# Patient Record
Sex: FEMALE | Race: WHITE | NOT HISPANIC OR LATINO
[De-identification: names, ages, dates, MRNs, and addresses within clinical notes are randomized per-mention and may not be internally consistent; named-entity substitution may affect disease eponyms.]

---

## 2017-01-14 ENCOUNTER — APPOINTMENT (OUTPATIENT)
Dept: ULTRASOUND IMAGING | Facility: CLINIC | Age: 27
End: 2017-01-14

## 2017-02-26 ENCOUNTER — RESULT REVIEW (OUTPATIENT)
Age: 27
End: 2017-02-26

## 2017-02-27 ENCOUNTER — APPOINTMENT (OUTPATIENT)
Dept: OBGYN | Facility: CLINIC | Age: 27
End: 2017-02-27

## 2017-04-24 ENCOUNTER — APPOINTMENT (OUTPATIENT)
Dept: OBGYN | Facility: CLINIC | Age: 27
End: 2017-04-24
Payer: COMMERCIAL

## 2017-04-24 PROCEDURE — 99213 OFFICE O/P EST LOW 20 MIN: CPT | Mod: 25

## 2017-04-24 PROCEDURE — 81025 URINE PREGNANCY TEST: CPT

## 2017-04-24 PROCEDURE — 76830 TRANSVAGINAL US NON-OB: CPT

## 2019-01-25 ENCOUNTER — APPOINTMENT (OUTPATIENT)
Dept: ORTHOPEDIC SURGERY | Facility: CLINIC | Age: 29
End: 2019-01-25
Payer: COMMERCIAL

## 2019-01-25 VITALS
SYSTOLIC BLOOD PRESSURE: 141 MMHG | WEIGHT: 140 LBS | DIASTOLIC BLOOD PRESSURE: 94 MMHG | HEIGHT: 64 IN | BODY MASS INDEX: 23.9 KG/M2 | HEART RATE: 60 BPM

## 2019-01-25 DIAGNOSIS — E72.11 HOMOCYSTINURIA: ICD-10-CM

## 2019-01-25 PROCEDURE — 72082 X-RAY EXAM ENTIRE SPI 2/3 VW: CPT

## 2019-01-25 PROCEDURE — 99203 OFFICE O/P NEW LOW 30 MIN: CPT

## 2019-01-25 NOTE — DISCUSSION/SUMMARY
[Medication Risks Reviewed] : Medication risks reviewed [de-identified] : Her scoliosis is stable. She should be seen again in 5 years. We discussed how to manage these episodes of back pain with tapering doses of over-the-counter nonsteroidal anti-inflammatory medicines. She will call me in 2 weeks if she has not made satisfactory progress.

## 2019-01-25 NOTE — HISTORY OF PRESENT ILLNESS
[de-identified] : I have treated this 28-year-old female since childhood. She has normal assistant Jennifer and significant scoliosis. She was last seen 4 years ago. We used a she had the onset of lower back pain and the following day he developed the flu. The back pain which was initially on the left side radiating towards the buttock was graded as a 7 and is currently only an ache. She was seen in urgent care where Flexeril was prescribed which she did not take. She took ibuprofen and blood only 400 mg once a day.

## 2019-01-25 NOTE — PHYSICAL EXAM
[de-identified] : On examination she continues with the stigmata of a significant scoliosis. There is no paravertebral muscle spasm. There is some left mid lumbar pain with side bending. There is some mild right-sided sciatic notch sensitivity but interestingly none on the left where she had some symptoms radiating into the left buttock. There is no trochanteric tenderness. Her lower extremity neurologic examination reveals 2+ symmetrical reflexes with normal motor power and sensation a straight leg raising is negative to 90°. [de-identified] : X-ray of the spine standing is stable. In 2015 she had a 38° right thoracic curve with a 41° left thoracolumbar curve and a 23° fractional lumbosacral curve. Today those curves are 36°, 41° and 23°.

## 2019-01-25 NOTE — REASON FOR VISIT
[Initial Visit] : an initial visit for [Back Pain] : back pain [Scoliosis] : scoliosis [FreeTextEntry2] : Resolving lower back pain and scoliosis

## 2019-04-08 ENCOUNTER — CHART COPY (OUTPATIENT)
Age: 29
End: 2019-04-08

## 2019-04-19 ENCOUNTER — APPOINTMENT (OUTPATIENT)
Dept: ORTHOPEDIC SURGERY | Facility: CLINIC | Age: 29
End: 2019-04-19
Payer: COMMERCIAL

## 2019-04-19 PROCEDURE — 99214 OFFICE O/P EST MOD 30 MIN: CPT

## 2019-04-19 NOTE — HISTORY OF PRESENT ILLNESS
[Worsening] : worsening [de-identified] : She has had a recent worsening of the back pain with radiation to the buttock lateral hip and anterior thigh that is worse with standing and immediately relieved with sitting. She has not had problems tolerating the Aleve the last 11 days but has made minimal improvement CDU

## 2019-04-19 NOTE — DISCUSSION/SUMMARY
[de-identified] : She has been switched to diclofenac 75 mg twice a day and I will see her for followup in 3-4 weeks. She will call there are problems with the medication or worsening of her symptoms. [Medication Risks Reviewed] : Medication risks reviewed

## 2019-04-19 NOTE — PHYSICAL EXAM
[de-identified] : Lower extremity reflexes are 1+ and equal with normal motor power and sensation. Straight leg raising is negative to 90°.

## 2019-05-13 ENCOUNTER — APPOINTMENT (OUTPATIENT)
Dept: ORTHOPEDIC SURGERY | Facility: CLINIC | Age: 29
End: 2019-05-13
Payer: COMMERCIAL

## 2019-05-13 DIAGNOSIS — M41.9 SCOLIOSIS, UNSPECIFIED: ICD-10-CM

## 2019-05-13 PROCEDURE — 99214 OFFICE O/P EST MOD 30 MIN: CPT

## 2019-05-13 NOTE — HISTORY OF PRESENT ILLNESS
[de-identified] : She is not having problems tolerating the Voltaren. Initially the symptoms were dramatically improved. The back pain it was graded as a 7 or an 8 at the time of her last visit is considerably less frequent but is still a 6 or 7 when she gets it. The same as so for the buttock and leg symptoms. She feels that a lot of his symptoms are related to stress at work. The symptoms are for the most part still worse standing and she does mostly sitting at work.

## 2019-05-13 NOTE — DISCUSSION/SUMMARY
[Medication Risks Reviewed] : Medication risks reviewed [de-identified] : She will continue the diclofenac 75 mg twice a day. I will see her for followup in 4 weeks. She has been given a prescription to obtain a liver function profile one week prior to her next visit

## 2019-06-17 ENCOUNTER — RX RENEWAL (OUTPATIENT)
Age: 29
End: 2019-06-17

## 2019-06-17 ENCOUNTER — APPOINTMENT (OUTPATIENT)
Dept: ORTHOPEDIC SURGERY | Facility: CLINIC | Age: 29
End: 2019-06-17
Payer: COMMERCIAL

## 2019-06-17 PROCEDURE — 99214 OFFICE O/P EST MOD 30 MIN: CPT

## 2019-06-17 NOTE — DISCUSSION/SUMMARY
[Medication Risks Reviewed] : Medication risks reviewed [de-identified] : She will finish her diclofenac 75 mg twice a day and then loaded dose to 50 mg twice a day for 30 days before switching to Aleve twice a day. I will see her for followup in 6 weeks. She is planning a trip to [facility].

## 2019-06-17 NOTE — PHYSICAL EXAM
[de-identified] : She is comfortable today. Reflexes remain 1-2+ and symmetrical and motor power is normal in all lower extremity groups. Straight leg raising is negative to 90°.

## 2019-06-17 NOTE — HISTORY OF PRESENT ILLNESS
[de-identified] : She has not had problems tolerating the diclofenac. Both the back and left buttock and leg pain are dramatically improved and currently only had intermittent discomfort. She has had some tingling in the sole of foot on an intermittent basis. Her liver function profile was normal to [Pain Location] : pain [Improving] : improving [2] : a maximum pain level of 2/10 [Intermit.] : ~He/She~ states the symptoms seem to be intermittent

## 2019-06-28 ENCOUNTER — CHART COPY (OUTPATIENT)
Age: 29
End: 2019-06-28

## 2019-06-28 RX ORDER — DICLOFENAC SODIUM 75 MG/1
75 TABLET, DELAYED RELEASE ORAL
Qty: 60 | Refills: 0 | Status: ACTIVE | COMMUNITY
Start: 2019-06-28 | End: 1900-01-01

## 2019-07-15 ENCOUNTER — RX RENEWAL (OUTPATIENT)
Age: 29
End: 2019-07-15

## 2019-08-02 ENCOUNTER — APPOINTMENT (OUTPATIENT)
Dept: ORTHOPEDIC SURGERY | Facility: CLINIC | Age: 29
End: 2019-08-02
Payer: COMMERCIAL

## 2019-08-02 DIAGNOSIS — M54.5 LOW BACK PAIN: ICD-10-CM

## 2019-08-02 DIAGNOSIS — M54.16 RADICULOPATHY, LUMBAR REGION: ICD-10-CM

## 2019-08-02 PROCEDURE — 99214 OFFICE O/P EST MOD 30 MIN: CPT

## 2019-08-02 NOTE — HISTORY OF PRESENT ILLNESS
[de-identified] : Symptoms became slightly worse before her trip to [facility] but she tolerated the trip without any difficulty and was taking the diclofenac 50 mg twice a day. She started to 2 Aleve twice a day 2 days ago and she is feeling very good. [Improving] : improving [Pain Location] : pain [0] : a maximum pain level of 0/10

## 2019-08-02 NOTE — DISCUSSION/SUMMARY
[Medication Risks Reviewed] : Medication risks reviewed [de-identified] : She will continue to 2 Aleve twice a day for 10 days and if she is doing well at that point we'll lower the dose to one twice a day for another week before discontinuing the Aleve altogether. She feels her symptoms are aggravated by the stress at her job.

## 2019-08-02 NOTE — PHYSICAL EXAM
[de-identified] : She is comfortable today. Motor power is normal in all lower extremity groups and straight leg raising is negative to 90°.

## 2020-07-09 ENCOUNTER — APPOINTMENT (OUTPATIENT)
Dept: PLASTIC SURGERY | Facility: CLINIC | Age: 30
End: 2020-07-09

## 2022-04-26 ENCOUNTER — NON-APPOINTMENT (OUTPATIENT)
Age: 32
End: 2022-04-26

## 2022-04-26 DIAGNOSIS — Z87.42 PERSONAL HISTORY OF OTHER DISEASES OF THE FEMALE GENITAL TRACT: ICD-10-CM

## 2022-04-26 DIAGNOSIS — Z98.890 OTHER SPECIFIED POSTPROCEDURAL STATES: ICD-10-CM

## 2022-04-26 DIAGNOSIS — N63.0 UNSPECIFIED LUMP IN UNSPECIFIED BREAST: ICD-10-CM

## 2022-04-26 DIAGNOSIS — Z72.89 OTHER PROBLEMS RELATED TO LIFESTYLE: ICD-10-CM

## 2022-04-28 ENCOUNTER — APPOINTMENT (OUTPATIENT)
Dept: OBGYN | Facility: CLINIC | Age: 32
End: 2022-04-28
Payer: COMMERCIAL

## 2022-04-28 VITALS
WEIGHT: 137 LBS | SYSTOLIC BLOOD PRESSURE: 155 MMHG | DIASTOLIC BLOOD PRESSURE: 79 MMHG | BODY MASS INDEX: 20.29 KG/M2 | HEIGHT: 69 IN

## 2022-04-28 PROCEDURE — 99203 OFFICE O/P NEW LOW 30 MIN: CPT

## 2022-04-28 RX ORDER — DICLOFENAC SODIUM 75 MG/1
75 TABLET, DELAYED RELEASE ORAL
Qty: 60 | Refills: 0 | Status: DISCONTINUED | COMMUNITY
Start: 2019-05-13 | End: 2022-04-28

## 2022-04-28 RX ORDER — BETAINE 1 G/G
POWDER, FOR SOLUTION ORAL
Refills: 0 | Status: ACTIVE | COMMUNITY
Start: 2022-04-28

## 2022-04-28 RX ORDER — DICLOFENAC SODIUM 50 MG/1
50 TABLET, DELAYED RELEASE ORAL
Qty: 60 | Refills: 0 | Status: DISCONTINUED | COMMUNITY
Start: 2019-06-17 | End: 2022-04-28

## 2022-04-28 RX ORDER — DICLOFENAC SODIUM 75 MG/1
75 TABLET, DELAYED RELEASE ORAL
Qty: 60 | Refills: 0 | Status: DISCONTINUED | COMMUNITY
Start: 2019-04-19 | End: 2022-04-28

## 2022-05-03 NOTE — PLAN
[FreeTextEntry1] : Discussed the issues regarding endometriosis at length. Treatment options of surgical evaluation vs empiric therapy with ocp's, depoprovera and gnrh agonist therapy were discussed.  The risks and limitations of surgical evaluation and prolonged gnrh agonist therapy were discussed in detail. Discussed the options ofsurgical tx vs hormonal mgt.  With increased risk of clots to consider progesteone tx and encourage iud use.  Pt to forward endo bx results and will notify if iud vs surgery.  Aware of the need for fu sono in 3 months if no surgery.\par

## 2022-05-03 NOTE — HISTORY OF PRESENT ILLNESS
[FreeTextEntry1] : Ms. Buchanan is a 32yo (LMP=4/25/22) P0 with known hx of endometriosis presenting for surgical consultation for a L ovarian cyst and abdominal/pelvic pain. L adnexa measures 6x5.6x3.1cm. In  2021 it was measuring 5.3x4.8x3cm and in 2018 Imeasured 3.7v3t6pu. She also had a thickened endometrial lining on TVUS. No AUB. She reports a normal EMBx. \par \par Pain is worse with menses and eating. She has been on Micronor in the past but discontinued after the heavy vaginal bleeding. 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  \par \par

## 2022-05-03 NOTE — HISTORY OF PRESENT ILLNESS
[FreeTextEntry1] : Ms. Buchanan is a 32yo (LMP=4/25/22) P0 with known hx of endometriosis presenting for surgical consultation for a L ovarian cyst and abdominal/pelvic pain. L adnexa measures 6x5.6x3.1cm. In  2021 it was measuring 5.3x4.8x3cm and in 2018 Imeasured 3.1v0v7wp. She also had a thickened endometrial lining on TVUS. No AUB. She reports a normal EMBx. \par \par Pain is worse with menses and eating. She has been on Micronor in the past but discontinued after the heavy vaginal bleeding. 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  \par \par

## 2022-05-26 DIAGNOSIS — N83.209 UNSPECIFIED OVARIAN CYST, UNSPECIFIED SIDE: ICD-10-CM

## 2022-05-27 ENCOUNTER — OUTPATIENT (OUTPATIENT)
Dept: OUTPATIENT SERVICES | Facility: HOSPITAL | Age: 32
LOS: 1 days | End: 2022-05-27
Payer: COMMERCIAL

## 2022-05-27 VITALS
OXYGEN SATURATION: 100 % | HEIGHT: 69 IN | WEIGHT: 134.04 LBS | SYSTOLIC BLOOD PRESSURE: 137 MMHG | TEMPERATURE: 99 F | RESPIRATION RATE: 16 BRPM | DIASTOLIC BLOOD PRESSURE: 81 MMHG | HEART RATE: 58 BPM

## 2022-05-27 DIAGNOSIS — E72.11 HOMOCYSTINURIA: ICD-10-CM

## 2022-05-27 DIAGNOSIS — Z01.818 ENCOUNTER FOR OTHER PREPROCEDURAL EXAMINATION: ICD-10-CM

## 2022-05-27 DIAGNOSIS — N83.202 UNSPECIFIED OVARIAN CYST, LEFT SIDE: ICD-10-CM

## 2022-05-27 DIAGNOSIS — Z98.890 OTHER SPECIFIED POSTPROCEDURAL STATES: Chronic | ICD-10-CM

## 2022-05-27 LAB
ANION GAP SERPL CALC-SCNC: 9 MMOL/L — SIGNIFICANT CHANGE UP (ref 5–17)
BLD GP AB SCN SERPL QL: NEGATIVE — SIGNIFICANT CHANGE UP
BUN SERPL-MCNC: 15 MG/DL — SIGNIFICANT CHANGE UP (ref 7–23)
CALCIUM SERPL-MCNC: 10 MG/DL — SIGNIFICANT CHANGE UP (ref 8.4–10.5)
CHLORIDE SERPL-SCNC: 103 MMOL/L — SIGNIFICANT CHANGE UP (ref 96–108)
CO2 SERPL-SCNC: 27 MMOL/L — SIGNIFICANT CHANGE UP (ref 22–31)
CREAT SERPL-MCNC: 0.66 MG/DL — SIGNIFICANT CHANGE UP (ref 0.5–1.3)
EGFR: 120 ML/MIN/1.73M2 — SIGNIFICANT CHANGE UP
GLUCOSE SERPL-MCNC: 86 MG/DL — SIGNIFICANT CHANGE UP (ref 70–99)
HCT VFR BLD CALC: 42.7 % — SIGNIFICANT CHANGE UP (ref 34.5–45)
HGB BLD-MCNC: 13.7 G/DL — SIGNIFICANT CHANGE UP (ref 11.5–15.5)
MCHC RBC-ENTMCNC: 30.4 PG — SIGNIFICANT CHANGE UP (ref 27–34)
MCHC RBC-ENTMCNC: 32.1 GM/DL — SIGNIFICANT CHANGE UP (ref 32–36)
MCV RBC AUTO: 94.9 FL — SIGNIFICANT CHANGE UP (ref 80–100)
NRBC # BLD: 0 /100 WBCS — SIGNIFICANT CHANGE UP (ref 0–0)
PLATELET # BLD AUTO: 218 K/UL — SIGNIFICANT CHANGE UP (ref 150–400)
POTASSIUM SERPL-MCNC: 4.1 MMOL/L — SIGNIFICANT CHANGE UP (ref 3.5–5.3)
POTASSIUM SERPL-SCNC: 4.1 MMOL/L — SIGNIFICANT CHANGE UP (ref 3.5–5.3)
RBC # BLD: 4.5 M/UL — SIGNIFICANT CHANGE UP (ref 3.8–5.2)
RBC # FLD: 12.7 % — SIGNIFICANT CHANGE UP (ref 10.3–14.5)
RH IG SCN BLD-IMP: POSITIVE — SIGNIFICANT CHANGE UP
SODIUM SERPL-SCNC: 139 MMOL/L — SIGNIFICANT CHANGE UP (ref 135–145)
WBC # BLD: 6.38 K/UL — SIGNIFICANT CHANGE UP (ref 3.8–10.5)
WBC # FLD AUTO: 6.38 K/UL — SIGNIFICANT CHANGE UP (ref 3.8–10.5)

## 2022-05-27 PROCEDURE — 80048 BASIC METABOLIC PNL TOTAL CA: CPT

## 2022-05-27 PROCEDURE — 86901 BLOOD TYPING SEROLOGIC RH(D): CPT

## 2022-05-27 PROCEDURE — 85027 COMPLETE CBC AUTOMATED: CPT

## 2022-05-27 PROCEDURE — 86850 RBC ANTIBODY SCREEN: CPT

## 2022-05-27 PROCEDURE — 86900 BLOOD TYPING SEROLOGIC ABO: CPT

## 2022-05-27 PROCEDURE — 36415 COLL VENOUS BLD VENIPUNCTURE: CPT

## 2022-05-27 PROCEDURE — G0463: CPT

## 2022-05-27 RX ORDER — CHLORHEXIDINE GLUCONATE 213 G/1000ML
1 SOLUTION TOPICAL ONCE
Refills: 0 | Status: DISCONTINUED | OUTPATIENT
Start: 2022-06-06 | End: 2022-06-06

## 2022-05-27 RX ORDER — CEFAZOLIN SODIUM 1 G
2000 VIAL (EA) INJECTION ONCE
Refills: 0 | Status: DISCONTINUED | OUTPATIENT
Start: 2022-06-06 | End: 2022-06-20

## 2022-05-27 RX ORDER — SODIUM CHLORIDE 9 MG/ML
3 INJECTION INTRAMUSCULAR; INTRAVENOUS; SUBCUTANEOUS EVERY 8 HOURS
Refills: 0 | Status: DISCONTINUED | OUTPATIENT
Start: 2022-06-06 | End: 2022-06-06

## 2022-05-27 NOTE — H&P PST ADULT - OTHER CARE PROVIDERS
Dr. Devlin (Xrcq) [phone], Genetic Specialist Dr. Combs [phone] (Clearance will be given by Genetic Spec. for Dr. Briones) Dr. Devlni (Fjjl) [phone], Genetic Specialist Dr. Sandoval [phone] (Clearance will be given by Genetic Spec. for Dr. Briones)

## 2022-05-27 NOTE — H&P PST ADULT - NSICDXPASTMEDICALHX_GEN_ALL_CORE_FT
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

## 2022-05-27 NOTE — H&P PST ADULT - NSICDXPASTSURGICALHX_GEN_ALL_CORE_FT
PAST SURGICAL HISTORY:  H/O inguinal hernia repair left 2014    S/P ovarian cystectomy left 2014

## 2022-05-27 NOTE — H&P PST ADULT - HISTORY OF PRESENT ILLNESS
31 yr old female with PMH of Endometriosis,  31 yr old female with PMH of Endometriosis, Left ovarian cyst s/p Lap left ovarian cystectomy 2014, Homocystinuria, covid 19 (12/2020, flu-like s/s, never hospitalized). Pt reports dysmenorrhea, menorrhagia during menstrual period with severe pelvic pain, worsening as of late. Imaging revealing left ovarian cyst that has grown since last sonogram. Pt evaluated by Dr. Briones for a scheduled Laparoscopic left ovarian cystectomy on 6/6/2022. Pt denies recent travel, sick contact, or recent covid infection.    *Covid swab on 6/5/22 at [facility]

## 2022-05-27 NOTE — H&P PST ADULT - GENITOURINARY COMMENTS
pelvic pain, US revealing left cyst in fall of 2021, pain progressing, cysto growing on last US 4/2022

## 2022-05-27 NOTE — H&P PST ADULT - PROBLEM SELECTOR PLAN 1
scheduled Laparoscopic left ovarian cystectomy on 6/6/2022  -preop instrions given  -ERP protocol  -Labs: CBC, BMP, T&S  -DOS: ABO, UCG  -obtain Clearance from genetic specialist

## 2022-06-04 ENCOUNTER — NON-APPOINTMENT (OUTPATIENT)
Age: 32
End: 2022-06-04

## 2022-06-05 ENCOUNTER — TRANSCRIPTION ENCOUNTER (OUTPATIENT)
Age: 32
End: 2022-06-05

## 2022-06-05 ENCOUNTER — OUTPATIENT (OUTPATIENT)
Dept: OUTPATIENT SERVICES | Facility: HOSPITAL | Age: 32
LOS: 1 days | End: 2022-06-05
Payer: COMMERCIAL

## 2022-06-05 DIAGNOSIS — Z98.890 OTHER SPECIFIED POSTPROCEDURAL STATES: Chronic | ICD-10-CM

## 2022-06-05 DIAGNOSIS — Z11.52 ENCOUNTER FOR SCREENING FOR COVID-19: ICD-10-CM

## 2022-06-05 LAB — SARS-COV-2 RNA SPEC QL NAA+PROBE: SIGNIFICANT CHANGE UP

## 2022-06-05 PROCEDURE — C9803: CPT

## 2022-06-05 PROCEDURE — U0003: CPT

## 2022-06-05 PROCEDURE — U0005: CPT

## 2022-06-06 ENCOUNTER — TRANSCRIPTION ENCOUNTER (OUTPATIENT)
Age: 32
End: 2022-06-06

## 2022-06-06 ENCOUNTER — APPOINTMENT (OUTPATIENT)
Dept: OBGYN | Facility: HOSPITAL | Age: 32
End: 2022-06-06

## 2022-06-06 ENCOUNTER — OUTPATIENT (OUTPATIENT)
Dept: OUTPATIENT SERVICES | Facility: HOSPITAL | Age: 32
LOS: 1 days | End: 2022-06-06
Payer: COMMERCIAL

## 2022-06-06 ENCOUNTER — RESULT REVIEW (OUTPATIENT)
Age: 32
End: 2022-06-06

## 2022-06-06 VITALS
HEIGHT: 69 IN | WEIGHT: 134.04 LBS | TEMPERATURE: 98 F | SYSTOLIC BLOOD PRESSURE: 133 MMHG | RESPIRATION RATE: 17 BRPM | HEART RATE: 77 BPM | OXYGEN SATURATION: 100 % | DIASTOLIC BLOOD PRESSURE: 78 MMHG

## 2022-06-06 DIAGNOSIS — Z01.818 ENCOUNTER FOR OTHER PREPROCEDURAL EXAMINATION: ICD-10-CM

## 2022-06-06 DIAGNOSIS — Z98.890 OTHER SPECIFIED POSTPROCEDURAL STATES: Chronic | ICD-10-CM

## 2022-06-06 DIAGNOSIS — N83.202 UNSPECIFIED OVARIAN CYST, LEFT SIDE: ICD-10-CM

## 2022-06-06 LAB — HCG UR QL: NEGATIVE — SIGNIFICANT CHANGE UP

## 2022-06-06 PROCEDURE — 58555 HYSTEROSCOPY DX SEP PROC: CPT

## 2022-06-06 PROCEDURE — 81025 URINE PREGNANCY TEST: CPT

## 2022-06-06 PROCEDURE — C1889: CPT

## 2022-06-06 PROCEDURE — 58662 LAPAROSCOPY EXCISE LESIONS: CPT

## 2022-06-06 PROCEDURE — 88305 TISSUE EXAM BY PATHOLOGIST: CPT | Mod: 26

## 2022-06-06 PROCEDURE — 58558 HYSTEROSCOPY BIOPSY: CPT

## 2022-06-06 PROCEDURE — 88305 TISSUE EXAM BY PATHOLOGIST: CPT

## 2022-06-06 PROCEDURE — 58662 LAPAROSCOPY EXCISE LESIONS: CPT | Mod: 80

## 2022-06-06 PROCEDURE — C9399: CPT

## 2022-06-06 DEVICE — VISTASEAL FIBRIN HUMAN 4ML: Type: IMPLANTABLE DEVICE | Site: LEFT | Status: FUNCTIONAL

## 2022-06-06 RX ORDER — ASPIRIN/CALCIUM CARB/MAGNESIUM 324 MG
1 TABLET ORAL
Qty: 0 | Refills: 0 | DISCHARGE

## 2022-06-06 RX ORDER — FOLIC ACID 0.8 MG
1 TABLET ORAL
Qty: 0 | Refills: 0 | DISCHARGE

## 2022-06-06 RX ORDER — PYRIDOXINE HCL (VITAMIN B6) 100 MG
1 TABLET ORAL
Qty: 0 | Refills: 0 | DISCHARGE

## 2022-06-06 RX ORDER — OXYCODONE HYDROCHLORIDE 5 MG/1
1 TABLET ORAL
Qty: 6 | Refills: 0
Start: 2022-06-06

## 2022-06-06 RX ORDER — SODIUM CHLORIDE 9 MG/ML
1000 INJECTION, SOLUTION INTRAVENOUS
Refills: 0 | Status: DISCONTINUED | OUTPATIENT
Start: 2022-06-06 | End: 2022-06-20

## 2022-06-06 RX ORDER — GABAPENTIN 400 MG/1
600 CAPSULE ORAL ONCE
Refills: 0 | Status: COMPLETED | OUTPATIENT
Start: 2022-06-06 | End: 2022-06-06

## 2022-06-06 RX ORDER — CALCIUM CARBONATE 500(1250)
2 TABLET ORAL
Qty: 0 | Refills: 0 | DISCHARGE

## 2022-06-06 RX ORDER — HYDROMORPHONE HYDROCHLORIDE 2 MG/ML
0.25 INJECTION INTRAMUSCULAR; INTRAVENOUS; SUBCUTANEOUS
Refills: 0 | Status: DISCONTINUED | OUTPATIENT
Start: 2022-06-06 | End: 2022-06-07

## 2022-06-06 RX ORDER — IBUPROFEN 200 MG
3 TABLET ORAL
Qty: 0 | Refills: 0 | DISCHARGE

## 2022-06-06 RX ORDER — CELECOXIB 200 MG/1
400 CAPSULE ORAL ONCE
Refills: 0 | Status: COMPLETED | OUTPATIENT
Start: 2022-06-06 | End: 2022-06-06

## 2022-06-06 RX ORDER — ACETAMINOPHEN 500 MG
3 TABLET ORAL
Qty: 0 | Refills: 0 | DISCHARGE

## 2022-06-06 RX ORDER — ONDANSETRON 8 MG/1
4 TABLET, FILM COATED ORAL ONCE
Refills: 0 | Status: COMPLETED | OUTPATIENT
Start: 2022-06-06 | End: 2022-06-06

## 2022-06-06 RX ORDER — ACETAMINOPHEN 500 MG
1000 TABLET ORAL ONCE
Refills: 0 | Status: COMPLETED | OUTPATIENT
Start: 2022-06-06 | End: 2022-06-06

## 2022-06-06 RX ADMIN — ONDANSETRON 4 MILLIGRAM(S): 8 TABLET, FILM COATED ORAL at 23:34

## 2022-06-06 RX ADMIN — SODIUM CHLORIDE 125 MILLILITER(S): 9 INJECTION, SOLUTION INTRAVENOUS at 21:15

## 2022-06-06 RX ADMIN — HYDROMORPHONE HYDROCHLORIDE 0.25 MILLIGRAM(S): 2 INJECTION INTRAMUSCULAR; INTRAVENOUS; SUBCUTANEOUS at 20:15

## 2022-06-06 RX ADMIN — HYDROMORPHONE HYDROCHLORIDE 0.25 MILLIGRAM(S): 2 INJECTION INTRAMUSCULAR; INTRAVENOUS; SUBCUTANEOUS at 21:30

## 2022-06-06 RX ADMIN — HYDROMORPHONE HYDROCHLORIDE 0.25 MILLIGRAM(S): 2 INJECTION INTRAMUSCULAR; INTRAVENOUS; SUBCUTANEOUS at 21:14

## 2022-06-06 RX ADMIN — CELECOXIB 400 MILLIGRAM(S): 200 CAPSULE ORAL at 13:20

## 2022-06-06 RX ADMIN — Medication 1000 MILLIGRAM(S): at 13:21

## 2022-06-06 RX ADMIN — GABAPENTIN 600 MILLIGRAM(S): 400 CAPSULE ORAL at 13:21

## 2022-06-06 NOTE — PATIENT PROFILE ADULT - FUNCTIONAL ASSESSMENT - DAILY ACTIVITY SECTION LABEL
. Acitretin Pregnancy And Lactation Text: This medication is Pregnancy Category X and should not be given to women who are pregnant or may become pregnant in the future. This medication is excreted in breast milk.

## 2022-06-06 NOTE — ASU DISCHARGE PLAN (ADULT/PEDIATRIC) - CARE PROVIDER_API CALL
Matt Briones)  Obstetrics and Gynecology  [address] [place] [address], [address]  [place] [address]  Phone: [phone]  Fax: [phone]  Follow Up Time:

## 2022-06-06 NOTE — ASU DISCHARGE PLAN (ADULT/PEDIATRIC) - NS MD DC FALL RISK RISK
For information on Fall & Injury Prevention, visit: [email or web address] OR  [email or web address] OR  https://www.cdc.gov/steadi/patient.html

## 2022-06-06 NOTE — BRIEF OPERATIVE NOTE - NSICDXBRIEFPOSTOP_GEN_ALL_CORE_FT
POST-OP DIAGNOSIS:  Endometrioma 06-Jun-2022 20:54:42  Santa Magallanes  Severe endometriosis 06-Jun-2022 20:54:50  Santa Magallanes

## 2022-06-06 NOTE — BRIEF OPERATIVE NOTE - OPERATION/FINDINGS
EUA  Laparoscopic EUA- mobile uterus, normal cervix and vagina, smooth RV septum  Hysteroscopy - polypoid endometrium, bilateral ostia visualized  Laparoscopic - normal appearing right ovary and tube, left ovary enlarged and adherent to left ovarian fossa with fibrotic nodule involving left ureter without hydroureter  - left ovary 5cm with multiple endometriotic cysts also adherent to anterior left uterus and bladder  - left fallopian tube was adherent to left ovary and immobile without hydrosalpinx  - bladder peritoneum with endometriotic lesions  - right uterosacral fibrotic nodule 1cm   - bilateral hemidiaphragms with hemosiderin laden lesions c/w endometriosis  - appendix normal appearing  - Stage IV endometriosis by AAGL Classification (Score 26)   - uterus small and mobile, normal appearing

## 2022-06-06 NOTE — BRIEF OPERATIVE NOTE - NSICDXBRIEFPROCEDURE_GEN_ALL_CORE_FT
PROCEDURES:  Laparoscopic left ovarian cystectomy 06-Jun-2022 20:52:26  Santa Magallanes  Laparoscopic surgical removal of endometriosis 06-Jun-2022 20:53:11  Santa Magallanes  Diagnostic hysteroscopy with dilation and curettage of uterus 06-Jun-2022 21:04:23  Santa Magallanes

## 2022-06-06 NOTE — BRIEF OPERATIVE NOTE - NSICDXBRIEFPREOP_GEN_ALL_CORE_FT
PRE-OP DIAGNOSIS:  Severe endometriosis 06-Jun-2022 20:54:20  Santa Magallanes  Adnexal mass 06-Jun-2022 20:54:29  Santa Magallanes

## 2022-06-06 NOTE — CHART NOTE - NSCHARTNOTEFT_GEN_A_CORE
R2 GYN POST-OP CHECK NOTE    SUBJECTIVE:    32yo F now POD0 s/p LSC left cystectomy, removal of endo    Pt reports pain well controlled by pain meds.  She is not yet out of bed.  Fernandes catheter removed in OR.  She is tolerating sips of clear liquids w/o N/V.  She denies fever, chills, nausea, vomiting, headache, dizziness, chest pain, palpitations, shortness of breath.    ceFAZolin   IVPB 2000 milliGRAM(s) IV Intermittent once  HYDROmorphone  Injectable 0.25 milliGRAM(s) IV Push every 10 minutes PRN  lactated ringers. 1000 milliLiter(s) IV Continuous <Continuous>  ondansetron Injectable 4 milliGRAM(s) IV Push once PRN      OBJECTIVE:    VITAL SIGNS:  Vital Signs Last 24 Hrs  T(C): 36.1 (06 Jun 2022 20:40), Max: 36.5 (06 Jun 2022 11:55)  T(F): 97 (06 Jun 2022 20:40), Max: 97.7 (06 Jun 2022 11:55)  HR: 64 (06 Jun 2022 21:15) (60 - 77)  BP: 131/82 (06 Jun 2022 21:15) (109/71 - 133/78)  BP(mean): 100 (06 Jun 2022 21:15) (86 - 100)  RR: 16 (06 Jun 2022 21:15) (16 - 17)  SpO2: 100% (06 Jun 2022 21:15) (100% - 100%)  CAPILLARY BLOOD GLUCOSE          06-06-22 @ 07:01  -  06-06-22 @ 23:22  --------------------------------------------------------  IN: 625 mL / OUT: 0 mL / NET: 625 mL        PHYSICAL EXAM:  GEN: NAD, A&Ox3  CV: RRR, no m/g/r  LUNGS: CTAB  ABD: soft, appropriately tender, ND, +BS  Incision: 3 LSC incision, CDI, dressings in place  EXT: WWP, no edema/TTP    LABS:            ASSESSMENT:  32yo F now POD0 s/p LSC left cystectomy, removal of endo  Patient is stable and progressing normally postoperatively.    NEURO: pain well controlled on current regimen  CV: hemodyamically stable  PULM: increase incentive spirometry  GI: advance diet as tolerated; colace/mylicon prn  : DTV  HEME: SCDs, early ambulation    Deepika PGY2

## 2022-06-07 VITALS
TEMPERATURE: 98 F | OXYGEN SATURATION: 100 % | RESPIRATION RATE: 16 BRPM | HEART RATE: 62 BPM | DIASTOLIC BLOOD PRESSURE: 79 MMHG | SYSTOLIC BLOOD PRESSURE: 129 MMHG

## 2022-06-07 RX ORDER — RIVAROXABAN 15 MG-20MG
1 KIT ORAL
Qty: 30 | Refills: 0
Start: 2022-06-07 | End: 2022-07-06

## 2022-06-08 ENCOUNTER — NON-APPOINTMENT (OUTPATIENT)
Age: 32
End: 2022-06-08

## 2022-06-14 LAB — SURGICAL PATHOLOGY STUDY: SIGNIFICANT CHANGE UP

## 2022-06-15 ENCOUNTER — APPOINTMENT (OUTPATIENT)
Dept: OBGYN | Facility: CLINIC | Age: 32
End: 2022-06-15
Payer: COMMERCIAL

## 2022-06-15 VITALS
HEIGHT: 69 IN | WEIGHT: 135 LBS | SYSTOLIC BLOOD PRESSURE: 118 MMHG | DIASTOLIC BLOOD PRESSURE: 82 MMHG | BODY MASS INDEX: 19.99 KG/M2

## 2022-06-15 PROBLEM — U07.1 COVID-19: Chronic | Status: ACTIVE | Noted: 2022-05-27

## 2022-06-15 PROCEDURE — 99024 POSTOP FOLLOW-UP VISIT: CPT

## 2022-06-17 NOTE — REASON FOR VISIT
[Post Op Day: ___] : Post-Op Day:  #[unfilled] [de-identified] : Laparoscopic left ovarian cystectomy, excision of endometriosis

## 2022-06-30 ENCOUNTER — APPOINTMENT (OUTPATIENT)
Dept: OBGYN | Facility: CLINIC | Age: 32
End: 2022-06-30

## 2022-06-30 VITALS
SYSTOLIC BLOOD PRESSURE: 115 MMHG | BODY MASS INDEX: 19.99 KG/M2 | HEIGHT: 69 IN | WEIGHT: 135 LBS | DIASTOLIC BLOOD PRESSURE: 78 MMHG

## 2022-06-30 PROCEDURE — 99024 POSTOP FOLLOW-UP VISIT: CPT

## 2022-06-30 NOTE — HISTORY OF PRESENT ILLNESS
[Pain is well-controlled] : pain is well-controlled [Fever] : no fever [Chills] : no chills [Nausea] : no nausea [Vomiting] : no vomiting [Clean/Dry/Intact] : clean, dry and intact [Erythema] : not erythematous [None] : no vaginal bleeding [Normal] : normal [Pathology reviewed] : pathology reviewed [de-identified] : Status post laparoscopic excision of endometriosis patient is doing well

## 2022-06-30 NOTE — REASON FOR VISIT
[Post Op Day: ___] : Post-Op Day:  #[unfilled] [de-identified] : Lap left ovarian cystectomy, excision of endometriosis

## 2024-12-24 PROBLEM — F10.90 ALCOHOL USE: Status: ACTIVE | Noted: 2022-04-26

## 2025-06-22 ENCOUNTER — NON-APPOINTMENT (OUTPATIENT)
Age: 35
End: 2025-06-22

## (undated) DEVICE — APPLICATOR VISTASEAL LAP DUAL 35CM RIGID

## (undated) DEVICE — GLV 6.5 PROTEXIS (WHITE)

## (undated) DEVICE — INSUFFLATION NDL COVIDIEN STEP 14G FOR STEP/VERSASTEP

## (undated) DEVICE — MEDICATION LABELS W MARKER

## (undated) DEVICE — NDL HYPO REGULAR BEVEL 22G X 1.5" (TURQUOISE)

## (undated) DEVICE — TROCAR COVIDIEN VERSASTEP PLUS 11MM STANDARD

## (undated) DEVICE — ENDOCATCH 10MM SPECIMEN POUCH

## (undated) DEVICE — Device

## (undated) DEVICE — LIGASURE BLUNT TIP 37CM

## (undated) DEVICE — PACK GYN LAPAROSCOPY

## (undated) DEVICE — SUT BIOSYN 4-0 18" P-12

## (undated) DEVICE — WARMING BLANKET UPPER ADULT

## (undated) DEVICE — SOL IRR POUR NS 0.9% 500ML

## (undated) DEVICE — TROCAR COVIDIEN VERSAPORT BLADELESS OPTICAL 5MM STANDARD

## (undated) DEVICE — APPLICATOR FOR ARISTA XL 38CM

## (undated) DEVICE — SUT POLYSORB 0 30" GS-23

## (undated) DEVICE — TROCAR APPLIED MEDICAL KII BALLOON BLUNT TIP 12MM X 100MM

## (undated) DEVICE — TUBING STRYKEFLOW II SUCTION / IRRIGATOR

## (undated) DEVICE — SPECIMEN CONTAINER 100ML

## (undated) DEVICE — SOL IRR POUR H2O 250ML

## (undated) DEVICE — DRAPE INSTRUMENT POUCH 6.75" X 11"

## (undated) DEVICE — SUT VLOC 180 0 12" GS-21 GREEN

## (undated) DEVICE — GOWN TRIMAX LG

## (undated) DEVICE — RUMI TIP BLUE 6.7MM X 8CM

## (undated) DEVICE — BLADE SCALPEL SAFETYLOCK #15

## (undated) DEVICE — POSITIONER FOAM EGG CRATE ULNAR 2PCS (PINK)

## (undated) DEVICE — SUT VLOC 180 0 18" GS-21 GREEN

## (undated) DEVICE — DRAPE TOWEL BLUE 17" X 24"

## (undated) DEVICE — UTERINE MANIPULATOR CLINICAL INNOVATIONS CLEARVIEW 7CM

## (undated) DEVICE — PREP CHLORAPREP HI-LITE ORANGE 26ML

## (undated) DEVICE — GLV 7 PROTEXIS (WHITE)

## (undated) DEVICE — TUBING INSUFFLATION LAP FILTER 10FT

## (undated) DEVICE — DRAPE MAYO STAND 30"

## (undated) DEVICE — DRAPE LIGHT HANDLE COVER (BLUE)

## (undated) DEVICE — FOLEY TRAY 16FR 5CC LTX UMETER CLOSED

## (undated) DEVICE — GLV 7.5 PROTEXIS (WHITE)

## (undated) DEVICE — VALVE YELLOW PORT SEAL PLUS 5MM

## (undated) DEVICE — SYR LUER LOK 10CC

## (undated) DEVICE — TROCAR COVIDIEN BLUNT TIP HASSAN 10MM

## (undated) DEVICE — BLADE SCALPEL SAFETYLOCK #10

## (undated) DEVICE — SUT VLOC 180 0 9" GS-21 GREEN

## (undated) DEVICE — PREP BETADINE SPONGE STICKS

## (undated) DEVICE — DRSG STERISTRIPS 0.5 X 4"

## (undated) DEVICE — UTERINE MANIPULATOR COOPER SURGICAL 5MM 33CM GREEN

## (undated) DEVICE — VENODYNE/SCD SLEEVE CALF LARGE

## (undated) DEVICE — ELCTR BOVIE PENCIL SMOKE EVACUATION

## (undated) DEVICE — MARKING PEN W RULER

## (undated) DEVICE — DRAPE 3/4 SHEET W REINFORCEMENT 56X77"

## (undated) DEVICE — TROCAR GELPOINT MINI ADVANCED